# Patient Record
Sex: MALE | Race: BLACK OR AFRICAN AMERICAN
[De-identification: names, ages, dates, MRNs, and addresses within clinical notes are randomized per-mention and may not be internally consistent; named-entity substitution may affect disease eponyms.]

---

## 2017-12-18 ENCOUNTER — HOSPITAL ENCOUNTER (OUTPATIENT)
Dept: HOSPITAL 47 - RADECHMAIN | Age: 32
Discharge: HOME | End: 2017-12-18
Attending: SPECIALIST
Payer: COMMERCIAL

## 2017-12-18 DIAGNOSIS — I51.7: Primary | ICD-10-CM

## 2017-12-18 DIAGNOSIS — G47.30: ICD-10-CM

## 2017-12-18 DIAGNOSIS — I05.8: ICD-10-CM

## 2017-12-18 PROCEDURE — 93306 TTE W/DOPPLER COMPLETE: CPT

## 2017-12-19 NOTE — ECHOF
Referral Reason:Sleep Apnea HTN HEART FAILURE



MEASUREMENTS

--------

HEIGHT: 190.5 cm

WEIGHT: 190.5 kg

BP: 146/81

RVIDd:   2.9 cm     (< 3.3)

IVSd:   1.7 cm     (0.6 - 1.1)

LVIDd:   5.1 cm     (3.9 - 5.3)

LVPWd:   1.7 cm     (0.6 - 1.1)

IVSs:   2.1 cm

LVIDs:   3.3 cm

LVPWs:   2.1 cm

LA Diam:   3.5 cm     (2.7 - 3.8)

Ao Diam:   3.4 cm     (2.0 - 3.7)

AV Cusp:   3.0 cm     (1.5 - 2.6)

MV EXCURSION:   22.907 mm     (> 18.000)

MV EF SLOPE:   100 mm/s     (70 - 150)

EPSS:   0.7 cm

MV E Edmund:   1.18 m/s

MV DecT:   238 ms

MV A Edmund:   0.62 m/s

MV E/A Ratio:   1.91 







FINDINGS

--------

Sinus rhythm.

This was a technically good study.

The left ventricular size is normal.   There is severe concentric left ventricular hypertrophy.   Ove
rall left ventricular systolic function is normal with, an EF between 55 - 60 %.

The right ventricle is normal in size.

The left atrial size is normal.

The right atrium is normal in size.

The aortic valve was not well visualized.

Mild mitral annular calcification present.

The tricuspid valve appears structurally normal.

There is no pulmonic regurgitation present.

The aortic root size is normal.

IVC Not well visulized.

There is no pericardial effusion.



CONCLUSIONS

--------

1. Sinus rhythm.

2. This was a technically good study.

3. The left ventricular size is normal.

4. There is severe concentric left ventricular hypertrophy.

5. Overall left ventricular systolic function is normal with, an EF between 55 - 60 %.

6. The right ventricle is normal in size.

7. The left atrial size is normal.

8. The right atrium is normal in size.

9. The aortic valve was not well visualized.

10. Mild mitral annular calcification present.

11. The tricuspid valve appears structurally normal.

12. There is no pulmonic regurgitation present.

13. The aortic root size is normal.

14. IVC Not well visulized.

15. There is no pericardial effusion.





SONOGRAPHER: Netta Barrett RDCS

## 2018-01-16 ENCOUNTER — HOSPITAL ENCOUNTER (OUTPATIENT)
Dept: HOSPITAL 47 - SLEEP | Age: 33
Discharge: HOME | End: 2018-01-16
Payer: COMMERCIAL

## 2018-01-16 DIAGNOSIS — Z90.89: ICD-10-CM

## 2018-01-16 DIAGNOSIS — Z99.89: ICD-10-CM

## 2018-01-16 DIAGNOSIS — E66.01: ICD-10-CM

## 2018-01-16 DIAGNOSIS — G47.33: Primary | ICD-10-CM

## 2018-01-16 DIAGNOSIS — I10: ICD-10-CM

## 2018-01-16 NOTE — PN
PROGRESS NOTE



This is a 32-year-old,  male patient with severe symptomatic

obstructive sleep apnea.  The patient was diagnosed having sleep apnea back in 2016 and

back then the patient was found to have an AHI of 108.  The patient was given a

CPAP/BiPAP titration that was not optimal as the patient continued to have obstructive

respiratory events.  He was unable to tolerate the BiPAP and was having ongoing

difficulties despite the various pressures that have been utilized.  His BiPAP machine

was set at a EPAP of 10, pressure support of 4 with a maximum pressure of 24 cm of

water.  He was given [place] full-face mask.  At a later stage the patient came for

a Pap nap and further adjustments were done.  The patient was discharged and he was

lost to followup until today when he comes into the sleep center and he is quite

symptomatic from his obstructive sleep apnea as he is having chronic hypersomnia and

sleepiness.  In the same time he has gained around 35 pounds since his original study

back in 2016.  His blood pressure remains poorly controlled.  In fact, his blood

pressure is 196/107 on both arms.  He snores and quits breathing.  He is somnolent and

sleepy throughout the day.  His current [place] Score is at 16.



BP is 196/100, pulse 92, respirations 20, temperature 98.6, BMI 54.7, saturation is 96%

on room air.  General appearance:  Morbidly obese, calm comfortable, not in acute

distress. Head is atraumatic, normocephalic.  Neck is supple.  There is no JVD.  No

goiter or neck masses.  Mallampati class 4.  Lungs clear to auscultation.  Diminished

in lung bases.  Heart sounds are regular rate and rhythm.  Normal S1, S2.  No S3, S4.

No murmurs.

Abdomen is soft.  Organs cannot be adequately assessed due to his morbid obesity.

Neurologic: The patient awake, alert and oriented x3.  There is no focal or

neurological deficits.  Psychiatric:  The patient has appropriate mood and affect.



IMPRESSION:

1. Severe symptomatic obstructive sleep apnea AHI of 108 at baseline based on a sleep

    study that was done in 2016.  Patient has failed CPAP/BiPAP titration.

2. Morbid obesity with a BMI of 54.7.

3. Chronic hypersomnia, [place] score of 16.

4. Hypertension, the blood pressure remains poorly controlled.

5. Previous history of tonsillectomy/adenoidectomy.



PLAN:

This patient has failed CPAP/BiPAP treatment.  He will be referred to ENT for a

surgical evaluation.  He will be seeing Dr. Art Veloz.  He may need to be

considered for surgical options for treatment of obstructive sleep apnea as the patient

has not been able to tolerate CPAP/BiPAP. One option for this patient would be lower

jaw advancement as maxillomandibular advancement as he has enlargement of the entire

upper airway for expansion to the skeletal framework that encircled the airway.  He is

open for these types of interventions and he will have his surgical evaluation with ENT

physician and all of the records were forwarded to him.  The patient will also need to

be followed up with his primary care physician regarding his poorly controlled blood

pressure.  We will continue to follow.





MMODL / IJN: [id number] / Job#: [id number]

## 2018-03-26 ENCOUNTER — HOSPITAL ENCOUNTER (OUTPATIENT)
Dept: HOSPITAL 47 - ORWHC2ENDO | Age: 33
Discharge: HOME | End: 2018-03-26
Payer: COMMERCIAL

## 2018-03-26 VITALS — DIASTOLIC BLOOD PRESSURE: 94 MMHG | HEART RATE: 70 BPM | SYSTOLIC BLOOD PRESSURE: 148 MMHG

## 2018-03-26 VITALS — RESPIRATION RATE: 16 BRPM | TEMPERATURE: 98.2 F

## 2018-03-26 VITALS — BODY MASS INDEX: 52.9 KG/M2

## 2018-03-26 DIAGNOSIS — K62.5: Primary | ICD-10-CM

## 2018-03-26 DIAGNOSIS — E66.01: ICD-10-CM

## 2018-03-26 DIAGNOSIS — I10: ICD-10-CM

## 2018-03-26 DIAGNOSIS — G47.33: ICD-10-CM

## 2018-03-26 DIAGNOSIS — Z79.891: ICD-10-CM

## 2018-03-26 DIAGNOSIS — Z79.899: ICD-10-CM

## 2018-03-26 DIAGNOSIS — K64.8: ICD-10-CM

## 2018-03-26 DIAGNOSIS — E11.9: ICD-10-CM

## 2018-03-26 DIAGNOSIS — F17.200: ICD-10-CM

## 2018-03-26 LAB — GLUCOSE BLD-MCNC: 108 MG/DL (ref 75–99)

## 2018-03-26 PROCEDURE — 45378 DIAGNOSTIC COLONOSCOPY: CPT

## 2018-03-26 NOTE — P.PCN
Date of Procedure: 03/26/18


Procedure(s) Performed: 


Procedure: Total colonoscopy.





Preoperative diagnosis: Intermittent rectal bleeding.  





Postoperative diagnoses: Low-grade internal hemorrhoids without any bleeding at 

the time of this exam, otherwise, exam within normal limits.





Preparation: HalfLytely prep.





Sedation: Was provided by anesthesia.





Brief clinical history: The patient is a 33-year-old male who is scheduled for 

this evaluation because of intermittent rectal bleeding for the last several 

months.  The patient had no prior exam or family history of inflammatory bowel 

disease.  He has no abdominal complaints, change in bowel habits or anemia.  No 

family history of colon cancer.





Procedure: With the patient on his left lateral decubitus position and after 

informed consent and adequate sedation, the perianal area was inspected and it 

did not show any fissures or fistulas.  There were no masses felt on digital 

rectal examination.  The Olympus CFQ 160L endoscope was then inserted in the 

rectum in the usual fashion and advanced to the cecum.  The mucosa appeared 

healthy.  No polyps or tumors were seen or any obvious diverticular disease or 

other pathology.  I retroflexed the endoscope in the rectum before the 

endoscope was withdrawn.  Low-grade internal hemorrhoids were noted but there 

was no evidence of bleeding.





The patient tolerated the procedure well.





Plan: The patient was reassured.  Discussed dietary measures and local care for 

hemorrhoids.  He will follow up with you as planned I recommended repeat exam 

at age 52.

## 2018-03-31 NOTE — CDI
Outpatient Documentation Clarification Form





Date: 03/31/2018



CDS/ Name: Ramakrishna Oliver

Phone: If any questions, call Richelle Steinberg  at [phone]



Patient Name: Wale Barriga

Account Number: [id number]

Admit Date:  03/26/2018   

Discharge Date: 03/26/2018

   

ATTENTION: The [facility] Coding Staff appreciate your assistance in clarifying 
documentation. Please respond to the clarification below the line at the bottom 
and electronically sign. The [facility] Coding staff will review the response and 
follow-up if needed. Please note: Queries are made part of the [facility] 
Record. If you have any questions, please contact the .



Dear Dr. Georges,



Indication for colonoscopy was rectal bleeding; Post-operative diagnosis was 
Internal and external hemorrhoids, 



Please clarify the etiology of Rectal Bleeding was this secondary to 
hemorrhoids or incidental finding



Based on your clinical opinion please clarify the etiology of Rectal Bleeding 
and relation with hemorrhoids





Thank you for your kind consideration.

________________________________________________________________________________
____________________________________________
MTDD

## 2018-08-21 ENCOUNTER — HOSPITAL ENCOUNTER (OUTPATIENT)
Dept: HOSPITAL 47 - SLEEP | Age: 33
Discharge: HOME | End: 2018-08-21
Attending: INTERNAL MEDICINE
Payer: COMMERCIAL

## 2018-08-21 DIAGNOSIS — I10: ICD-10-CM

## 2018-08-21 DIAGNOSIS — E66.9: ICD-10-CM

## 2018-08-21 DIAGNOSIS — Z99.89: ICD-10-CM

## 2018-08-21 DIAGNOSIS — Z90.89: ICD-10-CM

## 2018-08-21 DIAGNOSIS — G47.33: Primary | ICD-10-CM

## 2018-08-21 NOTE — PN
PROGRESS NOTE



Wale is a 33-year-old male patient with severe symptomatic obstructive sleep apnea.

I diagnosed this patient with NADIYA back in 2016 and at that time, the patient had an AHI

of 108.  He was given CPAP/BiPAP titration. The titration itself was not optimal as the

patient continued to have obstructive respiratory events.  He was unable to tolerate

the BiPAP at various pressures.  Will trial out different BiPAP settings and we trialed

out before mask interfaces and all these treatments failed on him as the patient was

unable to tolerate the treatment on his face.  During this time, he started gradually

gaining weight, and currently he is up to 400 pounds.  During his last evaluation in my

office back in January 2018 the patient showed interest towards surgical options.  He

was going to be seen by Dr. Art Veloz for surgical options regarding obstructive

sleep apnea.  The patient had a consultation with ENT and unfortunately the options

that were offered to him were not appealing as the patient did not want to go with the

surgical approaches that were offered to him.  He is coming back to see me.  He is more

symptomatic.  He is sleeping all the time.  He is very drowsy and sleepy throughout the

day.  His [place] score is 24.  As mentioned, he continues to steadily gained weight

and he has gained about 14 pounds since his original titration back in 2016.



On today's evaluation we discussed further options.  One option that I have suggested

to this patient is Inspire therapy which is surgically implanted device for obstructive

sleep apnea that can replace the CPAP therapy.  This is a small generator with

breathing sensor leads and stimulation leads to the throat muscles that can reduce the

severity of obstructive sleep apnea at least in his condition.  He was agreeable to

that and he wanted to consider further options including this possibility.



PHYSICAL EXAMINATION:

His current vitals: His blood pressure is 150/88, pulse 72, respirations 16,

temperature 98 saturation 95% on room air.  Weight is 400, height is 6 feet 1 inch.

GENERAL APPEARANCE: Obese, calm, comfortable.

HEENT: Atraumatic, normocephalic.

NECK:  Supple.  No JVD.  No goiter or neck masses.  Mallampati class IV.

LUNGS:  Clear to auscultation.

HEART:  Sounds regular.  Normal S1, S2.  No S3.  No murmurs.

ABDOMEN:  Soft and nontender.

EXTREMITIES:  No edema.  No cyanosis or clubbing.

NEUROLOGIC:  Alert and oriented x3.  No focal neurological deficit.

PSYCHIATRIC:  Negative for anxiety or depression at this point in time.



IMPRESSION:

1. Severe symptomatic obstructive sleep apnea with an AHI of 108 at baseline based on

    a polysomnogram that was done in 2016.  The patient has failed CPAP/BiPAP therapy.

    He has become more symptomatic and he has progressively gained weight and currently

    he is weighing 400 pounds.

2. Excessive hypersomnia [place] score of 24.

3. Obesity with a BMI of 52.7.

4. Hypertension.

5. Previous tonsillectomy and adenoidectomy.



PLAN:

This patient has failed CPAP and BiPAP therapy.  Obviously he needs to engage himself

in an aggressive weight loss progress program and he has also discussed this with his

primary care physician, Dr. Frye. As far as other options for NADIYA treatment, one

option that is being considered is the Inspire upper airway stimulation therapy through

a small generator can implanted under the skin and help with severity of obstructive

sleep apnea.  For that reason, I am making a referral to Dr. Farhana Garcia at

[facility].  The patient was given the appropriate contact number, #[phone].  The patient will contact her and make an appointment and he will see back in

followup and discuss the treatment options he was offered by a specialized ENT surgeon

who has significant expertise in NADIYA treatment.  Based on that we will make further

recommendations.  Meanwhile, avoid driving as the patient has a high risk of falling

sleep while driving or operating any other heavy machinery.  He is excessively

symptomatic at this point in time.





MMODL / IJN: [id number] / Job#: [id number]

## 2021-03-24 ENCOUNTER — HOSPITAL ENCOUNTER (OUTPATIENT)
Dept: HOSPITAL 47 - RADXRMAIN | Age: 36
Discharge: HOME | End: 2021-03-24
Attending: FAMILY MEDICINE
Payer: COMMERCIAL

## 2021-03-24 DIAGNOSIS — M25.512: ICD-10-CM

## 2021-03-24 DIAGNOSIS — M25.511: ICD-10-CM

## 2021-03-24 DIAGNOSIS — M54.5: Primary | ICD-10-CM

## 2021-03-24 PROCEDURE — 72100 X-RAY EXAM L-S SPINE 2/3 VWS: CPT

## 2021-03-24 PROCEDURE — 72070 X-RAY EXAM THORAC SPINE 2VWS: CPT

## 2021-03-24 NOTE — XR
EXAMINATION TYPE: XR shoulder complete BILAT

 

DATE OF EXAM: 3/24/2021

 

CLINICAL HISTORY: pain

 

TECHNIQUE:  Three views of the right shoulder are obtained.

 

COMPARISON: None 

 

FINDINGS:  There is no acute fracture/dislocation evident.  The acromioclavicular and glenohumeral rick
int spaces appear mildly narrowed.  The visualized ribs are intact and unremarkable.

 

IMPRESSION: 

 

1. There is no acute fracture or dislocation. 

 

ICD 10 NO FRACTURE, INITIAL EVALUATION

 

 

EXAMINATION TYPE: XR shoulder complete BILAT

 

DATE OF EXAM: 3/24/2021

 

CLINICAL HISTORY: pain

 

COMPARISON: NONE

 

TECHNIQUE:  Three views of the left shoulder are obtained.

 

FINDINGS:  There is no acute fracture/dislocation evident.  The acromioclavicular and glenohumeral rick
int spaces appear mildly narrowed.  The visualized ribs are intact and unremarkable.

 

IMPRESSION: 

 

1. There is no acute fracture or dislocation. 

 

ICD 10 NO FRACTURE, INITIAL EVALUATION

## 2021-03-24 NOTE — XR
EXAMINATION TYPE: XR thoracic spine 2V

 

DATE OF EXAM: 3/24/2021

 

CLINICAL HISTORY: pain

 

TECHNIQUE: Frontal, lateral, and swimmer's view of thoracic spine are obtained.  

 

COMPARISON: None.

 

FINDINGS: Thoracic spine show satisfactory alignment without evidence of acute fracture or dislocatio
n.  Vertebral body heights are preserved. Disc spaces are well preserved.   Visualized ribs are unrem
arkable.   

 

IMPRESSION: No acute fracture or dislocation is seen in the thoracic spine.  ICD 10 NO FRACTURE, INIT
IAL EVALUATION

## 2021-03-24 NOTE — XR
EXAMINATION TYPE: XR lumbar spine 2 or 3V

 

DATE OF EXAM: 3/24/2021

 

CLINICAL HISTORY: pain

 

TECHNIQUE: Three views of the lumbar spine are submitted.

 

COMPARISON: None.

 

FINDINGS:  

There are 5 lumbar type vertebral bodies identified.  The lumbar spine shows satisfactory alignment w
ithout evidence of acute fracture or dislocation. Vertebral body heights are within normal limits.   
Disc spaces are within normal limits.  The overlying soft tissue appears unremarkable.

 

IMPRESSION:  

No acute fracture or dislocation is seen in the lumbar spine.

 

ICD 10 NO FRACTURE, INITIAL EVALUATION

## 2021-08-28 ENCOUNTER — HOSPITAL ENCOUNTER (EMERGENCY)
Dept: HOSPITAL 47 - EC | Age: 36
Discharge: HOME | End: 2021-08-28
Payer: COMMERCIAL

## 2021-08-28 VITALS
DIASTOLIC BLOOD PRESSURE: 93 MMHG | RESPIRATION RATE: 20 BRPM | HEART RATE: 95 BPM | SYSTOLIC BLOOD PRESSURE: 156 MMHG | TEMPERATURE: 98.7 F

## 2021-08-28 DIAGNOSIS — I10: ICD-10-CM

## 2021-08-28 DIAGNOSIS — F12.90: ICD-10-CM

## 2021-08-28 DIAGNOSIS — L29.9: Primary | ICD-10-CM

## 2021-08-28 DIAGNOSIS — F17.200: ICD-10-CM

## 2021-08-28 DIAGNOSIS — G47.30: ICD-10-CM

## 2021-08-28 DIAGNOSIS — E11.9: ICD-10-CM

## 2021-08-28 PROCEDURE — 99282 EMERGENCY DEPT VISIT SF MDM: CPT

## 2021-08-28 NOTE — ED
General Adult HPI





- General


Chief complaint: Allergic Reaction


Stated complaint: Itching & irritation


Time Seen by Provider: 08/28/21 18:23


Source: patient, family, RN notes reviewed


Mode of arrival: ambulatory


Limitations: no limitations





- History of Present Illness


Initial comments: 





Patient is a pleasant 36-year-old male presenting to the emergency department 

for itching.  Onset of symptoms was a past couple of days.  Patient questions if

this soap he is using is related to this.  Patient has not noticed any rash 

however feels itchy.  Patient states this is several areas including his legs 

and buttocks and groin.  Patient states that seems to migrate different areas at

different times.  Patient states the symptoms also waxing waning and currently 

are mild.





- Related Data


                                Home Medications











 Medication  Instructions  Recorded  Confirmed


 


HYDROcodone/APAP 7.5-325MG [Norco 1 tab PO Q6HR PRN 03/22/18 03/26/18





7.5-325]   


 


amLODIPine [Norvasc] 5 mg PO DAILY 03/22/18 03/26/18


 


hydroCHLOROthiazide [Hydrodiuril] 50 mg PO DAILY 03/22/18 03/26/18








                                  Previous Rx's











 Medication  Instructions  Recorded


 


predniSONE 50 mg PO DAILY #5 tab 08/28/21











                                    Allergies











Allergy/AdvReac Type Severity Reaction Status Date / Time


 


No Known Allergies Allergy   Verified 03/26/18 10:05














Review of Systems


ROS Statement: 


Those systems with pertinent positive or pertinent negative responses have been 

documented in the HPI.





ROS Other: All systems not noted in ROS Statement are negative.


Constitutional: Denies: fever


Eyes: Denies: eye pain


ENT: Denies: ear pain


Respiratory: Denies: cough


Cardiovascular: Denies: chest pain


Endocrine: Denies: fatigue


Gastrointestinal: Denies: nausea


Genitourinary: Denies: urgency


Musculoskeletal: Denies: back pain


Skin: Reports: as per HPI, pruritus


Neurological: Denies: weakness





Past Medical History


Past Medical History: Diabetes Mellitus, Hypertension, Sleep Apnea/CPAP/BIPAP


Additional Past Medical History / Comment(s): supposed to use CPAP, swelling 

legs & feet, joint & back pain, rectal bleeding for awhile, "pre-diabetic"


History of Any Multi-Drug Resistant Organisms: None Reported


Past Surgical History: Adenoidectomy, Tonsillectomy


Additional Past Surgical History / Comment(s): myringotomy & tubes


Past Anesthesia/Blood Transfusion Reactions: No Reported Reaction


Past Psychological History: No Psychological Hx Reported


Smoking Status: Current every day smoker


Past Alcohol Use History: Occasional


Past Drug Use History: Marijuana





- Past Family History


  ** Mother


Family Medical History: No Reported History





General Exam


Limitations: no limitations


General appearance: alert, in no apparent distress


Head exam: Present: normocephalic


Eye exam: Present: normal appearance


ENT exam: Present: normal oropharynx


Neck exam: Present: normal inspection


Respiratory exam: Present: normal lung sounds bilaterally


Cardiovascular Exam: Present: regular rate, normal rhythm


 exam: Present: normal inspection


Extremities exam: Present: normal inspection


Neurological exam: Present: alert


Psychiatric exam: Present: normal affect, normal mood


Skin exam: Present: normal color.  Absent: rash





Course





                                   Vital Signs











  08/28/21





  18:16


 


Temperature 98.7 F


 


Pulse Rate 95


 


Respiratory 20





Rate 


 


Blood Pressure 156/93


 


O2 Sat by Pulse 95





Oximetry 














Disposition


Clinical Impression: 


 Pruritus





Disposition: HOME SELF-CARE


Condition: Stable


Instructions (If sedation given, give patient instructions):  Antihistamine (By 

mouth), Allergies (ED)


Additional Instructions: 


Use soap without additives such as Dove.  When showering avoid warm or hot 

showers.  Over-the-counter Benadryl as needed.  If Benadryl makes her drowsy you

 may try to substitute Claritin or Allegra instead.  Prescription for steroids 

has been sent to pharmacy if needed.  Please follow-up with primary care 

physician in the next couple days for recheck.  Return for rash, fever, 

worsening or changing symptoms or other concerns.


Prescriptions: 


predniSONE 50 mg PO DAILY #5 tab


Is patient prescribed a controlled substance at d/c from ED?: No


Referrals: 


Russell Frye MD [Primary Care Provider] - 1-2 days


Time of Disposition: 18:39

## 2021-09-14 ENCOUNTER — HOSPITAL ENCOUNTER (OUTPATIENT)
Dept: HOSPITAL 47 - SLEEP | Age: 36
End: 2021-09-14
Attending: INTERNAL MEDICINE
Payer: COMMERCIAL

## 2021-09-14 DIAGNOSIS — I10: ICD-10-CM

## 2021-09-14 DIAGNOSIS — G47.36: ICD-10-CM

## 2021-09-14 DIAGNOSIS — Z99.89: ICD-10-CM

## 2021-09-14 DIAGNOSIS — E66.01: ICD-10-CM

## 2021-09-14 DIAGNOSIS — G47.33: Primary | ICD-10-CM

## 2021-09-14 NOTE — PN
PROGRESS NOTE



This is a 36-year-old male patient with severe symptomatic obstructive sleep apnea

diagnosed several years back with an AHI of 109.  The patient is post UPPP.  The

patient has demonstrated poor compliance over the years. He stated he has not been able

to tolerate BiPAP therapy.  I have worked with this patient extensively in the past;

tried different pressures, tried different mask interfaces, without any good success.

His last evaluation with me was around 1/2 years ago.  Currently he is coming back for

re-evaluation, as the patient has become quite symptomatic and his functionality is

obviously affected and the patient has become significantly somnolent and sleepy.  His

weight is up to 426 pounds, which is around 20 pounds' weight gain compared to 2-1/2

years ago.  He has a functional VPAP unit which is set at a pressure of 17/30 cm of

water.  Based on the compliance data, the patient has not used his machine over the

past one year.  He is tired.  He is sleepy.  He is somnolent.  He can fall asleep at

any time and his functionality is affected.  His [place] score is at 24.  At the same

time, he has developed increased swelling in the lower extremities and his blood

pressure today is 195/92.  He is not aware of the different blood pressure medication

that he is taking and he does not have his medication list with him.  He tells me that

he has been followed up with Dr. Russell Frye on an outpatient basis regarding his

hypertension.  No recent CVA.  No recent cardiac arrhythmias.  He is a morbidly obese

-American male patient.



REVIEW OF SYSTEMS:

Fourteen-point review of systems was done and it is positive for chronic hypersomnia

and sleepiness and difficulty with functionality, poor sleep quality, frequent arousals

due to sleep apnea, increased lower extremity edema, unable to lie down flat.  He is a

mouth breather.



PHYSICAL EXAMINATION:

VITAL SIGNS: BP is 195/92, pulse 91, respirations 24, temperature 97.6, saturation 97%

on room air.  Height is 6 feet 2 inches, weight is 426, BMI 54.9.  [place] score 24.

GENERAL APPEARANCE:  Calm, comfortable.

HEAD: Atraumatic, normocephalic.

NECK:  Supple.  No JVD.  No goiter or neck masses. Mallampati class 4.

LUNGS: Diminished. Otherwise clear.

HEART:  Heart sounds are regular. Positive S1, S2.  No S3, S4.  No murmurs.

ABDOMEN:  Obese.  Organs cannot be palpated.  There is no direct tenderness, rebound

tenderness or guarding.

EXTREMITIES: Plus 1 to 2 pitting edema.  There is no cyanosis or clubbing.



IMPRESSION:

1. Severe obstructive sleep apnea, AHI of 109, symptomatic due to suboptimal BiPAP

    use.  In fact, the patient has not used his machine at all over the past one year.

2. Excessive hypersomnia secondary to above.  [place] score is 24.

3. Severe nocturnal oxygen desaturation secondary to obstructive sleep apnea.

4. Morbid obesity with interval 20-pound weight gain. Current body mass index is 54.9.

5. Possible obesity hypoventilation syndrome, as the patient has excessive edema in

    the lower extremities bilaterally, and he needs to be worked up for cardiac

    disease.

6. Hypertension, poorly controlled _____ blood pressure medication.  Question

    compliance on the drugs, as the patient does not even recall the different brands

    of the blood pressure medication and he does not carry a medication list with him.



PLAN:

1. I urged this patient to go back to his primary care physician for tighter blood

    pressure control.

2. In regard to his treatment of sleep apnea, the patient has a functioning BiPAP unit

    that needs to be reactivated.  The pressure will be kept at 17/30 cm of water.  I

    gave him a DreamWear under-the-nose full-face mask, medium size, which we tried

    extensively and educated the patient on its use. The patient seems to be liking

    this style of mask.  He is going to give it a try and come and see me back in a

    month's time in followup.  Sleep hygiene measures are obviously abnormal, and the

    patient was encouraged to optimize and regulate his sleep schedule. Comorbidities

    need to be handled by his primary care physician, especially the poorly controlled

    blood pressure.  Will continue to follow.





MMODL / IJN: [id number] / Job#: [id number]

## 2021-10-26 ENCOUNTER — HOSPITAL ENCOUNTER (OUTPATIENT)
Dept: HOSPITAL 47 - SLEEP | Age: 36
End: 2021-10-26
Attending: INTERNAL MEDICINE
Payer: COMMERCIAL

## 2021-10-26 DIAGNOSIS — F17.200: ICD-10-CM

## 2021-10-26 DIAGNOSIS — G47.33: Primary | ICD-10-CM

## 2021-10-26 DIAGNOSIS — Z99.89: ICD-10-CM

## 2021-10-26 NOTE — PN
PROGRESS NOTE



This patient is being seen for a followup regarding his obstructive sleep apnea

treatment.  Overall, he is still doing poorly.  He remains somnolent and sleepy during

the day.  He continues to have an irregular sleep schedule.  He is unable to regulate

his sleep schedule, as the patient goes to bed very late, around 2 to 3 a.m. He wakes

up at 6 a.m. in the morning to drop his kid to school.  After that he comes back home

and he sleeps further.  He takes naps during the day.  I checked his compliance, and

despite my ongoing efforts to optimize his obstructive sleep apnea, I have not been

able to do so.  His weight continues to go up. He used to weigh 426 pounds and

currently he is up to 432.  He is currently on a BiPAP pressure of 17/13 cm of water.

After an extensive search trial for various masks, the patient settled on a DreamWear

under-the-nose mask, medium wide. Nevertheless, based on the compliance data that was

collected over the past 30 days, the patient has utilized his machine only 1/30 days

and he has achieved only 1.8 hours of BiPAP use.  Leak was 46 L/minute.  AHI was down

to 40 on that day of use.  [place] score remains quite high.  His blood pressure is

high.  He has poorly controlled hypertension. He is somnolent and sleepy during the

day.  As such, I would say there is no improvement in his condition at all.  He

continues to make commitments to improve his lifestyle, and on each occasion I think

that he is going to fail to do so.  He wants to talk to his primary care physician

about bariatric surgery options, also.



REVIEW OF SYSTEMS:

Fourteen-point review of system was done.  He continues to be somnolent and sleepy and

tired with poor sleep quality.  He has chronic edema, lower extremities. Blood pressure

remains poorly controlled.  No seizure activity.  No falls.  No motor vehicle accident

because of feeling drowsy or sleepy.



PHYSICAL EXAMINATION:

BP is 188/117, pulse 94, respirations 20, temperature _____, saturation 92% on room

air.  Height is 6 feet 2 inches, weight is 432, BMI 55.4.  [place] score is 24.

GENERAL APPEARANCE:  Obese, calm, comfortable.

HEAD: Atraumatic, normocephalic.

NECK:  Supple.  No JVD.  No goiter or neck masses. Mallampati class IV.

LUNGS: Diminished. Breath sounds bilaterally. Otherwise clear.

HEART:  Heart sounds are regular rate and rhythm. Normal S1, S2.  No S3, S4.  No

murmurs.

Abdomen is obese, soft, non-tender. Organs cannot be accurately palpated.

EXTREMITIES: Chronic edema.  There is no cyanosis or clubbing at this point.



IMPRESSION:

Severe symptomatic obstructive sleep apnea.  The patient carries an AHI of 109.  He is

post UPPP.  He demonstrates poor compliancy to CPAP therapy.  He has very poor sleep

hygiene.  He has a very poor sleep schedule.  He has very poor commitment to BiPAP

treatment.  He is not making an effort to go on his machine and the compliancy data

clearly reflects that, as the patient has not worn his machine at all over the past 30

days.  He tried it only one night and he barely achieved 2 hours.



I am not able to offer much to this patient. I asked him to continue using his BiPAP.

I counseled him extensively.  He will be seeing his primary care physician for

bariatric surgery options.  Meanwhile, the patient will be kept on a DreamWear medium

wide full-face mask.  I switched him to an automatic VPAP mode at a pressure minimum of

10, maximum of 22, with a pressure support of 4.  I will see him back in 6 months'

time.  Prognosis is poor due to his poor compliance and poor followup on treatment.





JEMAL / ZOE: [id number] / Job#: [id number]

## 2021-10-27 ENCOUNTER — HOSPITAL ENCOUNTER (OUTPATIENT)
Dept: HOSPITAL 47 - RADXRMAIN | Age: 36
Discharge: HOME | End: 2021-10-27
Attending: FAMILY MEDICINE
Payer: COMMERCIAL

## 2021-10-27 DIAGNOSIS — M25.551: ICD-10-CM

## 2021-10-27 DIAGNOSIS — M25.569: Primary | ICD-10-CM

## 2021-10-27 PROCEDURE — 72100 X-RAY EXAM L-S SPINE 2/3 VWS: CPT

## 2021-10-27 PROCEDURE — 73521 X-RAY EXAM HIPS BI 2 VIEWS: CPT

## 2021-10-27 NOTE — XR
EXAMINATION TYPE: XR lumbar spine 2 or 3V

 

DATE OF EXAM: 10/27/2021

 

CLINICAL HISTORY: pain

 

TECHNIQUE: Three views of the lumbar spine are submitted.

 

COMPARISON: None.

 

FINDINGS:  

There are 5 lumbar type vertebral bodies identified.  The lumbar spine shows satisfactory alignment w
ithout evidence of acute fracture or dislocation. Vertebral body heights are within normal limits.   
Disc spaces are within normal limits.  The overlying soft tissue appears unremarkable.

 

IMPRESSION:  

No acute fracture or dislocation is seen in the lumbar spine.

 

ICD 10 NO FRACTURE, INITIAL EVALUATION

## 2021-10-27 NOTE — XR
EXAMINATION TYPE: XR Hip Bilateral Complete

 

DATE OF EXAM: 10/27/2021

 

CLINICAL HISTORY: pain

 

TECHNIQUE:  AP and frogleg views of the bilateral hips are obtained.

 

COMPARISON: None.

 

FINDINGS:  There is no acute fracture/dislocation evident.  The joint space  appears within normal li
mits.  The overlying soft tissue appears unremarkable.

 

IMPRESSION:  

1.  There is no acute fracture or dislocation.

 

ICD 10 NO FRACTURE, INITIAL EVALUATION

## 2022-09-14 ENCOUNTER — HOSPITAL ENCOUNTER (EMERGENCY)
Dept: HOSPITAL 47 - EC | Age: 37
Discharge: HOME | End: 2022-09-14
Payer: COMMERCIAL

## 2022-09-14 VITALS — TEMPERATURE: 98.4 F | RESPIRATION RATE: 20 BRPM

## 2022-09-14 VITALS — HEART RATE: 98 BPM | DIASTOLIC BLOOD PRESSURE: 99 MMHG | SYSTOLIC BLOOD PRESSURE: 174 MMHG

## 2022-09-14 DIAGNOSIS — F17.200: ICD-10-CM

## 2022-09-14 DIAGNOSIS — E11.9: ICD-10-CM

## 2022-09-14 DIAGNOSIS — Z79.84: ICD-10-CM

## 2022-09-14 DIAGNOSIS — I10: Primary | ICD-10-CM

## 2022-09-14 DIAGNOSIS — Z99.89: ICD-10-CM

## 2022-09-14 DIAGNOSIS — Z79.899: ICD-10-CM

## 2022-09-14 PROCEDURE — 99282 EMERGENCY DEPT VISIT SF MDM: CPT

## 2022-09-14 NOTE — ED
ENT HPI





- General


Chief complaint: ENT


Stated complaint: foregin body in ear


Time Seen by Provider: 09/14/22 07:36


Source: patient, RN notes reviewed, old records reviewed


Mode of arrival: ambulatory


Limitations: no limitations





- History of Present Illness


Initial comments: 





37-year-old nontoxic-appearing gentleman presents to the emergency room with pos

sible foreign body, cotton from Q-tip, stuck in his left ear since around 6:30 

this morning.


MD complaint: foreign body


-: hour(s) (1)


Location: L ear


Severity scale (1-10): 0





- Related Data


                                Home Medications











 Medication  Instructions  Recorded  Confirmed


 


HYDROcodone/APAP 7.5-325MG [Norco 1 tab PO Q6HR PRN 03/22/18 03/26/18





7.5-325]   


 


amLODIPine [Norvasc] 5 mg PO DAILY 03/22/18 03/26/18


 


hydroCHLOROthiazide [Hydrodiuril] 50 mg PO DAILY 03/22/18 03/26/18








                                  Previous Rx's











 Medication  Instructions  Recorded


 


predniSONE 50 mg PO DAILY #5 tab 08/28/21











                                    Allergies











Allergy/AdvReac Type Severity Reaction Status Date / Time


 


No Known Allergies Allergy   Verified 03/26/18 10:05














Review of Systems


ROS Statement: 


Those systems with pertinent positive or pertinent negative responses have been 

documented in the HPI.





ROS Other: All systems not noted in ROS Statement are negative.





Past Medical History


Past Medical History: Diabetes Mellitus, Hypertension, Sleep Apnea/CPAP/BIPAP


Additional Past Medical History / Comment(s): supposed to use CPAP, swelling 

legs & feet, joint & back pain, rectal bleeding for awhile, "pre-diabetic"


History of Any Multi-Drug Resistant Organisms: None Reported


Past Surgical History: Adenoidectomy, Tonsillectomy


Additional Past Surgical History / Comment(s): myringotomy & tubes


Past Anesthesia/Blood Transfusion Reactions: No Reported Reaction


Past Psychological History: No Psychological Hx Reported


Smoking Status: Current every day smoker


Past Alcohol Use History: Occasional


Past Drug Use History: Marijuana





- Past Family History


  ** Mother


Family Medical History: No Reported History





General Exam


Limitations: no limitations


General appearance: alert, in no apparent distress


Head exam: Present: atraumatic, normocephalic


ENT exam: Present: mucous membranes moist


  ** Expanded


Ear exam: Present: normal external inspection, other (No erythema, no evidence 

of foreign body.  There is hole left eardrum from prior myringotomy tube)


Neck exam: Present: full ROM.  Absent: tenderness, meningismus


Respiratory exam: Absent: respiratory distress, accessory muscle use


Cardiovascular Exam: Present: tachycardia


GI/Abdominal exam: Present: soft


Neurological exam: Present: alert, oriented X3, normal gait


Psychiatric exam: Present: normal affect, normal mood


Skin exam: Present: warm, dry, normal color.  Absent: cyanosis, diaphoretic, 

erythema





Course


                                   Vital Signs











  09/14/22 09/14/22





  07:33 08:01


 


Temperature 98.4 F 


 


Pulse Rate 102 H 98


 


Respiratory 20 20





Rate  


 


Blood Pressure 205/130 174/99


 


O2 Sat by Pulse 97 96





Oximetry  














Medical Decision Making





- Medical Decision Making





Patient presents with a possible foreign body, cotton from Q-tip in his left 

ear.  On exam there is no evidence of foreign body.  No erythema no drainage.  

Right ear tympanic membrane normal, no foreign body. 


Patient's blood pressure is elevated however he states that he does not want to 

take blood pressure medication despite being prescribed by his PCP.  Patient 

declining treatment for hypertension.  He denies any chest pain or shortness of 

breath.  No nausea vomiting diarrhea or fevers.  No visual changes or headaches.

  


I did explain to the patient, that uncontrolled hypertension can cause heart 

attack, stroke, kidney failure and death.  He was instructed to follow-up with 

his primary care doctor for continuation of care and ENT as needed.  Case 

discussed with Dr. Bateman





Disposition


Clinical Impression: 


 Hypertension





Disposition: HOME SELF-CARE


Condition: Good


Instructions (If sedation given, give patient instructions):  Earache (ED), 

Hypertension (ED)


Additional Instructions: 


Follow-up with an ENT doctor as needed.  


Please discuss high blood pressure management with your primary care doctor as 

high blood pressure can cause stroke, heart attack, kidney failure and many 

other problems if not adequately controlled.


Is patient prescribed a controlled substance at d/c from ED?: No


Referrals: 


Russell Frye MD [Primary Care Provider] - 1-2 days


Tony More MD [STAFF PHYSICIAN] - 1-2 days


Time of Disposition: 07:50

## 2023-05-18 ENCOUNTER — HOSPITAL ENCOUNTER (EMERGENCY)
Dept: HOSPITAL 47 - EC | Age: 38
Discharge: HOME | End: 2023-05-18
Payer: MEDICARE

## 2023-05-18 VITALS
RESPIRATION RATE: 18 BRPM | DIASTOLIC BLOOD PRESSURE: 68 MMHG | SYSTOLIC BLOOD PRESSURE: 133 MMHG | HEART RATE: 86 BPM | TEMPERATURE: 98.4 F

## 2023-05-18 DIAGNOSIS — E11.9: ICD-10-CM

## 2023-05-18 DIAGNOSIS — Z79.899: ICD-10-CM

## 2023-05-18 DIAGNOSIS — R07.89: Primary | ICD-10-CM

## 2023-05-18 DIAGNOSIS — F17.200: ICD-10-CM

## 2023-05-18 DIAGNOSIS — F12.90: ICD-10-CM

## 2023-05-18 DIAGNOSIS — I10: ICD-10-CM

## 2023-05-18 LAB
ALBUMIN SERPL-MCNC: 4.4 G/DL (ref 3.5–5)
ALP SERPL-CCNC: 52 U/L (ref 38–126)
ALT SERPL-CCNC: 17 U/L (ref 4–49)
ANION GAP SERPL CALC-SCNC: 8 MMOL/L
AST SERPL-CCNC: 31 U/L (ref 17–59)
BASOPHILS # BLD AUTO: 0 K/UL (ref 0–0.2)
BASOPHILS NFR BLD AUTO: 0 %
BUN SERPL-SCNC: 18 MG/DL (ref 9–20)
CALCIUM SPEC-MCNC: 9.5 MG/DL (ref 8.4–10.2)
CHLORIDE SERPL-SCNC: 93 MMOL/L (ref 98–107)
CO2 SERPL-SCNC: 35 MMOL/L (ref 22–30)
EOSINOPHIL # BLD AUTO: 0.3 K/UL (ref 0–0.7)
EOSINOPHIL NFR BLD AUTO: 4 %
ERYTHROCYTE [DISTWIDTH] IN BLOOD BY AUTOMATED COUNT: 5.35 M/UL (ref 4.3–5.9)
ERYTHROCYTE [DISTWIDTH] IN BLOOD: 12.9 % (ref 11.5–15.5)
GLUCOSE SERPL-MCNC: 102 MG/DL (ref 74–99)
HCT VFR BLD AUTO: 47.8 % (ref 39–53)
HGB BLD-MCNC: 16.3 GM/DL (ref 13–17.5)
LYMPHOCYTES # SPEC AUTO: 2 K/UL (ref 1–4.8)
LYMPHOCYTES NFR SPEC AUTO: 27 %
MCH RBC QN AUTO: 30.6 PG (ref 25–35)
MCHC RBC AUTO-ENTMCNC: 34.2 G/DL (ref 31–37)
MCV RBC AUTO: 89.5 FL (ref 80–100)
MONOCYTES # BLD AUTO: 0.4 K/UL (ref 0–1)
MONOCYTES NFR BLD AUTO: 6 %
NEUTROPHILS # BLD AUTO: 4.5 K/UL (ref 1.3–7.7)
NEUTROPHILS NFR BLD AUTO: 61 %
PLATELET # BLD AUTO: 261 K/UL (ref 150–450)
POTASSIUM SERPL-SCNC: 3.6 MMOL/L (ref 3.5–5.1)
PROT SERPL-MCNC: 8.2 G/DL (ref 6.3–8.2)
SODIUM SERPL-SCNC: 136 MMOL/L (ref 137–145)
WBC # BLD AUTO: 7.3 K/UL (ref 3.8–10.6)

## 2023-05-18 PROCEDURE — 84484 ASSAY OF TROPONIN QUANT: CPT

## 2023-05-18 PROCEDURE — 80053 COMPREHEN METABOLIC PANEL: CPT

## 2023-05-18 PROCEDURE — 99285 EMERGENCY DEPT VISIT HI MDM: CPT

## 2023-05-18 PROCEDURE — 93005 ELECTROCARDIOGRAM TRACING: CPT

## 2023-05-18 PROCEDURE — 85025 COMPLETE CBC W/AUTO DIFF WBC: CPT

## 2023-05-18 PROCEDURE — 36415 COLL VENOUS BLD VENIPUNCTURE: CPT

## 2023-05-18 PROCEDURE — 71046 X-RAY EXAM CHEST 2 VIEWS: CPT

## 2023-05-18 NOTE — ED
Chest Pain HPI





- General


Chief Complaint: Chest Pain


Stated Complaint: chest pain


Time Seen by Provider: 05/18/23 17:55


Source: patient, RN notes reviewed, old records reviewed


Mode of arrival: ambulatory





- History of Present Illness


Initial Comments: 





This is a 38-year-old male to the emergency department today.  Patient presents 

today for evaluation of chest pain anterior chest pain left-sided chest pain.  

History of high blood pressure history of diabetes symptoms began when he awoke 

shortly after he awakened.  Patient is patient was worse to touch which is pain 

that was different than normal.  Patient is no travel or sick contacts no trauma

no fevers no cough congestion and no current shortness of breath symptoms are 

significant improved patient concern from [facility] Complaint: chest pain


-: unknown


Onset: during rest, during exertion


Pain Location: substernal, left chest


Pain Radiation: none, LUE


Severity scale (1-10): 3


Quality: tightness, heaviness


Consistency: constant, intermittent


Improves With: nothing


Worsens With: nothing, exertion


Other Symptoms: palpitations


Treatments Prior to Arrival: none





- Related Data


                                Home Medications











 Medication  Instructions  Recorded  Confirmed


 


Ammonium Lactate Cream [Lac-Hydrin 1 applic TOPICAL DAILY 02/08/23 02/08/23





12% Cream]   


 


Furosemide [Lasix] 40 mg PO DAILY 02/08/23 02/08/23


 


HYDROcodone/APAP 10-325MG [Norco 1 tab PO Q6H PRN 02/08/23 02/08/23





]   


 


amLODIPine 10 mg PO DAILY 02/08/23 02/08/23











                                    Allergies











Allergy/AdvReac Type Severity Reaction Status Date / Time


 


No Known Allergies Allergy   Verified 05/18/23 17:14














Review of Systems


ROS Statement: 


Those systems with pertinent positive or pertinent negative responses have been 

documented in the HPI.





ROS Other: All systems not noted in ROS Statement are negative.





EKG Findings





- EKG Comments:


EKG Findings:: EKG is sinus 76   QTc 429





Past Medical History


Past Medical History: Diabetes Mellitus, Hypertension, Sleep Apnea/CPAP/BIPAP


Additional Past Medical History / Comment(s): supposed to use CPAP, swelling 

legs & feet, joint & back pain, rectal bleeding for awhile, "pre-diabetic"


History of Any Multi-Drug Resistant Organisms: None Reported


Past Surgical History: Adenoidectomy, Tonsillectomy


Additional Past Surgical History / Comment(s): myringotomy & tubes


Past Anesthesia/Blood Transfusion Reactions: No Reported Reaction


Past Psychological History: No Psychological Hx Reported


Smoking Status: Current every day smoker


Past Alcohol Use History: None Reported


Past Drug Use History: Marijuana





- Past Family History


  ** Mother


Family Medical History: No Reported History





General Exam


General appearance: alert, in no apparent distress


Head exam: Present: atraumatic, normocephalic, normal inspection


Eye exam: Present: normal appearance, PERRL, EOMI.  Absent: scleral icterus, 

conjunctival injection, periorbital swelling


ENT exam: Present: normal exam, mucous membranes moist


Neck exam: Present: normal inspection.  Absent: tenderness, meningismus, 

lymphadenopathy


Respiratory exam: Present: normal lung sounds bilaterally.  Absent: respiratory 

distress, wheezes, rales, rhonchi, stridor


Cardiovascular Exam: Present: regular rate, normal rhythm, normal heart sounds. 

Absent: systolic murmur, diastolic murmur, rubs, gallop, clicks


GI/Abdominal exam: Present: soft, normal bowel sounds.  Absent: distended, 

tenderness, guarding, rebound, rigid


Extremities exam: Present: normal inspection, full ROM, normal capillary refill.

 Absent: tenderness, pedal edema, joint swelling, calf tenderness


Back exam: Present: normal inspection


Neurological exam: Present: alert, oriented X3, CN II-XII intact


Psychiatric exam: Present: normal affect, normal mood


Skin exam: Present: warm, dry, intact, normal color.  Absent: rash





Course


                                   Vital Signs











  05/18/23





  17:10


 


Temperature 98.4 F


 


Pulse Rate 86


 


Respiratory 18





Rate 


 


Blood Pressure 133/68


 


O2 Sat by Pulse 97





Oximetry 














- Reevaluation(s)


Reevaluation #1: 





05/18/23 18:09


Medical records reviewed


Reevaluation #2: 





05/18/23 18:09


Patient symptoms are improving


Reevaluation #3: 





05/18/23 19:46


Patient informed of results and questions answered


Reevaluation #4: 





05/18/23 20:06


Was pt. sent in by a medical professional or institution?


@  -no


Did you speak to anyone other than the patient for history?  


@  -no


Did you review nursing and triage notes? 


@  -agree


Were old charts reviewed? 


@  -no


Differential Diagnosis? 


@  -prior


EKG interpreted by me (3pts min.)?


@  -yes


X-rays interpreted by me (1pt min.)?


@  -yes


CT interpreted by me (1pt min.)?


@  -no


U/S interpreted by me (1pt. min.)?


@  -no


What testing was considered but not performed? (CT, X-rays, U/S, labs)? Why?


@  -no


What meds were considered but not given? Why?


@  -no


Did you discuss the management of the patient with other professionals?


@  -no


Did you reconcile home meds?


@  -no


Was smoking cessation discussed for >3mins.?


@  -no


Was critical care preformed (if so, how long)?


@  -no


Were there social determinants of health that impacted care today? How? 

(Homelessness, low income, unemployed, alcoholism, drug addiction, 

transportation, low edu. Level, literacy, decrease access to med. care, care home, 

rehab)?


@  -no


Was there de-escalation of care discussed even if they declined? (Discuss DNR or

withdrawal of care, Hospice)?


@  -no


What co-morbidities impacted this encounter? (DM, HTN, Smoking, COPD, CAD, 

Cancer, CVA, Hep., AIDS, mental health diagnosis, sleep apnea, morbid obesity)?


@  -no


Was patient admitted / discharged?


@  -dc 


Undiagnosed new problem with uncertain prognosis?


@  -no


Drug Therapy requiring intensive monitoring for toxicity (Heparin, Nitro, 

Insulin, Cardizem)?


@  -no


Were any procedures done?


@  -no


Diagnosis/symptom?


@  -Chest pain atypical


Acute, or Chronic, or Acute on Chronic?


@  -acute


Uncomplicated (without systemic symptoms) or Complicated (systemic symptoms)?


@  -no


Side effects of treatment?


@  -no


Exacerbation, Progression, or Severe Exacerbation]


@  -


Poses a threat to life or bodily function?


@  -no


Reevaluation #5: 





05/18/23 19:46


Differential Chest Pain:


Stable Angina, Unstable Angina, STEMI, NSTEMI Aortic Dissection, Pneumothorax, 

Musculoskeletal, Esophageal Spasm GERD, Cholecystitis, Pancreatitis, Zoster, 

this is not meant to be an all-inclusive list. 





Disposition


Clinical Impression: 


 Atypical chest pain, Chest pain





Disposition: HOME SELF-CARE


Condition: Good


Instructions (If sedation given, give patient instructions):  Chest Pain (ED)


Is patient prescribed a controlled substance at d/c from ED?: No


Referrals: 


Russell Frye MD [Primary Care Provider] - 1-2 days


Time of Disposition: 18:30

## 2023-05-18 NOTE — XR
EXAMINATION TYPE: XR chest 2V

 

DATE OF EXAM: 5/18/2023 5:50 PM

 

COMPARISON: Chest radiographs from

 

TECHNIQUE: XR chest 2V Frontal and lateral views of the chest.

 

CLINICAL INDICATION:Male, 38 years old with history of chest pain; 

 

FINDINGS: 

Lungs/Pleura: Eventration of the diaphragm. There is no evidence of pleural effusion, focal consolida
tion, or pneumothorax.  

Pulmonary vascularity: Unremarkable.

Heart/mediastinum: Cardiomediastinal silhouette is unremarkable.

Musculoskeletal: No acute osseous pathology.

 

IMPRESSION: 

No acute cardiopulmonary disease/process.

## 2024-11-19 ENCOUNTER — HOSPITAL ENCOUNTER (EMERGENCY)
Dept: HOSPITAL 47 - EC | Age: 39
Discharge: HOME | End: 2024-11-19
Payer: MEDICARE

## 2024-11-19 VITALS — HEART RATE: 81 BPM | DIASTOLIC BLOOD PRESSURE: 89 MMHG | SYSTOLIC BLOOD PRESSURE: 178 MMHG

## 2024-11-19 VITALS — TEMPERATURE: 97.7 F | RESPIRATION RATE: 20 BRPM

## 2024-11-19 DIAGNOSIS — F17.200: ICD-10-CM

## 2024-11-19 DIAGNOSIS — H60.92: Primary | ICD-10-CM

## 2024-11-19 PROCEDURE — 99282 EMERGENCY DEPT VISIT SF MDM: CPT

## 2024-11-19 RX ADMIN — CIPROFLOXACIN AND DEXAMETHASONE STA DROPS: 3; 1 SUSPENSION/ DROPS AURICULAR (OTIC) at 15:29

## 2025-06-17 ENCOUNTER — HOSPITAL ENCOUNTER (OUTPATIENT)
Dept: HOSPITAL 47 - 3 N SLEEP | Age: 40
End: 2025-06-17
Attending: INTERNAL MEDICINE
Payer: MEDICARE

## 2025-06-17 VITALS
RESPIRATION RATE: 18 BRPM | DIASTOLIC BLOOD PRESSURE: 111 MMHG | TEMPERATURE: 98.3 F | HEART RATE: 80 BPM | SYSTOLIC BLOOD PRESSURE: 169 MMHG

## 2025-06-17 DIAGNOSIS — G89.29: ICD-10-CM

## 2025-06-17 DIAGNOSIS — E66.01: ICD-10-CM

## 2025-06-17 DIAGNOSIS — G47.33: Primary | ICD-10-CM

## 2025-06-17 DIAGNOSIS — Z99.89: ICD-10-CM

## 2025-06-17 DIAGNOSIS — I10: ICD-10-CM

## 2025-06-17 DIAGNOSIS — E11.9: ICD-10-CM

## 2025-06-17 PROCEDURE — 99212 OFFICE O/P EST SF 10 MIN: CPT
